# Patient Record
Sex: MALE | Race: WHITE
[De-identification: names, ages, dates, MRNs, and addresses within clinical notes are randomized per-mention and may not be internally consistent; named-entity substitution may affect disease eponyms.]

---

## 2017-01-28 ENCOUNTER — HOSPITAL ENCOUNTER (OUTPATIENT)
Dept: HOSPITAL 80 - FCPNEURO | Age: 62
End: 2017-01-28
Attending: PSYCHIATRY & NEUROLOGY
Payer: COMMERCIAL

## 2017-01-28 DIAGNOSIS — G47.33: Primary | ICD-10-CM

## 2017-12-20 ENCOUNTER — HOSPITAL ENCOUNTER (EMERGENCY)
Dept: HOSPITAL 80 - FED | Age: 62
Discharge: HOME | End: 2017-12-20
Payer: COMMERCIAL

## 2017-12-20 VITALS
RESPIRATION RATE: 18 BRPM | SYSTOLIC BLOOD PRESSURE: 148 MMHG | HEART RATE: 89 BPM | OXYGEN SATURATION: 92 % | DIASTOLIC BLOOD PRESSURE: 77 MMHG

## 2017-12-20 VITALS — TEMPERATURE: 97.7 F

## 2017-12-20 DIAGNOSIS — E11.9: ICD-10-CM

## 2017-12-20 DIAGNOSIS — R10.31: Primary | ICD-10-CM

## 2017-12-20 DIAGNOSIS — I10: ICD-10-CM

## 2017-12-20 DIAGNOSIS — Z79.82: ICD-10-CM

## 2017-12-20 DIAGNOSIS — E86.9: ICD-10-CM

## 2017-12-20 LAB
% IMMATURE GRANULYOCYTES: 0.3 % (ref 0–1.1)
ABSOLUTE IMMATURE GRANULOCYTES: 0.02 10^3/UL (ref 0–0.1)
ABSOLUTE NRBC COUNT: 0 10^3/UL (ref 0–0.01)
ADD DIFF?: NO
ADD MORPH?: NO
ADD SCAN?: NO
ALBUMIN SERPL-MCNC: 3.6 G/DL (ref 3.5–5)
ALP SERPL-CCNC: 46 IU/L (ref 38–126)
ALT SERPL-CCNC: 76 IU/L (ref 21–72)
ANION GAP SERPL CALC-SCNC: 12 MEQ/L (ref 8–16)
AST SERPL-CCNC: 41 IU/L (ref 17–59)
ATYPICAL LYMPHOCYTE FLAG: 50 (ref 0–99)
BILIRUB SERPL-MCNC: 0.6 MG/DL (ref 0.1–1.4)
BILIRUBIN-CONJUGATED: 0.2 MG/DL (ref 0–0.5)
BILIRUBIN-UNCONJUGATED: 0.4 MG/DL (ref 0–1.1)
CALCIUM SERPL-MCNC: 8.8 MG/DL (ref 8.5–10.4)
CHLORIDE SERPL-SCNC: 106 MEQ/L (ref 97–110)
CO2 SERPL-SCNC: 21 MEQ/L (ref 22–31)
COLOR UR: (no result)
CREAT SERPL-MCNC: 0.8 MG/DL (ref 0.7–1.3)
ERYTHROCYTE [DISTWIDTH] IN BLOOD BY AUTOMATED COUNT: 12.5 % (ref 11.5–15.2)
FRAGMENT RBC FLAG: 0 (ref 0–99)
GFR SERPL CREATININE-BSD FRML MDRD: > 60 ML/MIN/{1.73_M2}
GLUCOSE SERPL-MCNC: 104 MG/DL (ref 70–100)
HCT VFR BLD CALC: 43.1 % (ref 40–51)
HGB BLD-MCNC: 15.3 G/DL (ref 13.7–17.5)
LEFT SHIFT FLG: 0 (ref 0–99)
LIPEMIA HEMOLYSIS FLAG: 90 (ref 0–99)
MCH RBC BLDCO QN: 31 PG (ref 27.9–34.1)
MCHC RBC AUTO-ENTMCNC: 35.5 G/DL (ref 32.4–36.7)
MCV RBC AUTO: 87.4 FL (ref 81.5–99.8)
MUCOUS THREADS #/AREA URNS LPF: (no result) /LPF
NITRITE UR QL STRIP: NEGATIVE
NRBC-AUTO%: 0 % (ref 0–0.2)
PH UR STRIP: 5 [PH] (ref 5–7.5)
PLATELET # BLD: 232 10^3/UL (ref 150–400)
PLATELET CLUMPS FLAG: 0 (ref 0–99)
PMV BLD AUTO: 9.6 FL (ref 8.7–11.7)
POTASSIUM SERPL-SCNC: 4.5 MEQ/L (ref 3.5–5.2)
PROT SERPL-MCNC: 6.1 G/DL (ref 6.3–8.2)
RBC # BLD AUTO: 4.93 10^6/UL (ref 4.4–6.38)
RBC #/AREA URNS HPF: (no result) /HPF (ref 0–3)
SODIUM SERPL-SCNC: 139 MEQ/L (ref 134–144)
SP GR UR STRIP: 1.01 (ref 1–1.03)
WBC #/AREA URNS HPF: (no result) /HPF (ref 0–3)

## 2017-12-20 PROCEDURE — 74177 CT ABD & PELVIS W/CONTRAST: CPT

## 2017-12-20 PROCEDURE — 96360 HYDRATION IV INFUSION INIT: CPT

## 2017-12-20 PROCEDURE — 99285 EMERGENCY DEPT VISIT HI MDM: CPT

## 2017-12-20 NOTE — EDPHY
H & P


Time Seen by Provider: 12/20/17 10:34


HPI/ROS: 





CHIEF COMPLAINT:  Abdominal pain





HISTORY OF PRESENT ILLNESS:  62-year-old man started getting sick last Thursday 

with nausea and vomiting.  He was better on Saturday but then developed 

diarrhea on Sunday and had another episode of vomiting on Monday.  Presents 

with 24 hr of right-sided abdominal pain but he is able to take oral fluids 

now.  Not associated with fever or chills or urinary symptoms. Does not 

radiate.  Not better worse with eating.





REVIEW OF SYSTEMS:


Eye: no change in vision


ENT: no sore throat


Cardiac: no chest pain or syncope


Pulmonary: no cough or SOB


Abdomen:  HPI


Musculoskeletal: no back pain


Skin: no rash


Neuro: no headache


Constitutional: no fever


: no urinary symptoms





A comprehensive 10 point review of systems is otherwise negative aside from 

elements mentioned in the history of present illness.





PAST MEDICAL HISTORY:  Includes traumatic brain injury, diabetes, hypertension, 

glaucoma, reflux.





Social history:  The patient arrives with a caregiver from [facility] house.





General Appearance: Alert and conversant, cooperative.


Eyes: No scleral  icterus. 


ENT, Mouth: Normal mucous membranes.


Respiratory: Normal respiratory effort, breath sounds equal, lungs are clear to 

auscultation.


Cardiovascular:  Regular rate and rhythm.


Gastrointestinal:  Right lower quadrant and right lateral abdominal tenderness 

but no rebound or guarding, normal male .


Neurological: Alert and oriented x3.   Normally conversant. Face symmetric, 

normal movement and sensation in all extremities.


Skin: Warm and dry, no rashes.


Musculoskeletal: No peripheral edema and no joint swelling.


Psychiatric: Not agitated.





Emergency Department course/MDM:


IV normal saline hydration, i-STAT and CT abdomen and pelvis to evaluate for 

appendicitis or diverticulitis, urinalysis.





1206: CT shows normal appendix, no diverticulitis or perforation other reason 

for abdominal pain. Sarah.


1233:  Results discussed with the patient, probable abdominal pain from his 

gastrointestinal illness or from vomiting.  Doubt surgical abdominal condition, 

stable for discharge.


Smoking Status: Never smoked


Constitutional: 


 Initial Vital Signs











Temperature (C)  36.5 C   12/20/17 10:29


 


Heart Rate  77   12/20/17 10:29


 


Respiratory Rate  16   12/20/17 10:29


 


Blood Pressure  146/93 H  12/20/17 10:29


 


O2 Sat (%)  93   12/20/17 10:29








 











O2 Delivery Mode               Room Air














Allergies/Adverse Reactions: 


 





No Known Allergies Allergy (Unverified 12/20/17 10:27)


 








Home Medications: 














 Medication  Instructions  Recorded


 


Arimidex 1 mg (*)  08/15/16


 


Aspirin 81mg (*)  08/15/16


 


BETAGAN 0.5% (*)  08/15/16


 


Lisinopril  08/15/16


 


MAGNESIUM  08/15/16


 


Ranitidine HCl  08/15/16


 


Various Vitamins  08/15/16


 


Ferrous Sulfate  12/20/17


 


Glucosamine  12/20/17


 


Mirapex  12/20/17














Medical Decision Making





- Diagnostics


Imaging Results: 


 Imaging Impressions





Abdomen CT  12/20/17 10:54


Impression: 


 


1. Bibasilar pleural calcifications, likely reflecting prior asbestos-related 

pleural disease.


2. Hepatomegaly.


3. Subcentimeter right hepatic dome cyst, and a 6.3 cm right renal cortical 

simple cyst.


4. Constipation/obstipation.


5. Mild descending colon and sigmoid colon diverticulosis without active 

diverticulitis.


6. Normal appearance of the retrocecal appendix.


7. Prostatomegaly. 


8. Morbid obesity. There is also some nonspecific inflammation of the deep 

subcutaneous adipose peripheral lateral to the right external oblique muscle.


 


Findings were discussed with KIRILL KAISER MD at 12:03, on 12/20/2017.


 














- Data Points


Laboratory Results: 


 Laboratory Results





 12/20/17 10:45 





 12/20/17 10:45 





 











  12/20/17 12/20/17 12/20/17





  12:05 10:45 10:45


 


WBC      7.79 10^3/uL 10^3/uL





     (3.80-9.50) 


 


RBC      4.93 10^6/uL 10^6/uL





     (4.40-6.38) 


 


Hgb      15.3 g/dL g/dL





     (13.7-17.5) 


 


POC Hgb      





    


 


Hct      43.1 % %





     (40.0-51.0) 


 


POC Hct      





    


 


MCV      87.4 fL fL





     (81.5-99.8) 


 


MCH      31.0 pg pg





     (27.9-34.1) 


 


MCHC      35.5 g/dL g/dL





     (32.4-36.7) 


 


RDW      12.5 % %





     (11.5-15.2) 


 


Plt Count      232 10^3/uL 10^3/uL





     (150-400) 


 


MPV      9.6 fL fL





     (8.7-11.7) 


 


Neut % (Auto)      60.1 % %





     (39.3-74.2) 


 


Lymph % (Auto)      27.7 % %





     (15.0-45.0) 


 


Mono % (Auto)      8.3 % %





     (4.5-13.0) 


 


Eos % (Auto)      3.0 % %





     (0.6-7.6) 


 


Baso % (Auto)      0.6 % %





     (0.3-1.7) 


 


Nucleat RBC Rel Count      0.0 % %





     (0.0-0.2) 


 


Absolute Neuts (auto)      4.68 10^3/uL 10^3/uL





     (1.70-6.50) 


 


Absolute Lymphs (auto)      2.16 10^3/uL 10^3/uL





     (1.00-3.00) 


 


Absolute Monos (auto)      0.65 10^3/uL 10^3/uL





     (0.30-0.80) 


 


Absolute Eos (auto)      0.23 10^3/uL 10^3/uL





     (0.03-0.40) 


 


Absolute Basos (auto)      0.05 10^3/uL 10^3/uL





     (0.02-0.10) 


 


Absolute Nucleated RBC      0.00 10^3/uL 10^3/uL





     (0-0.01) 


 


Immature Gran %      0.3 % %





     (0.0-1.1) 


 


Immature Gran #      0.02 10^3/uL 10^3/uL





     (0.00-0.10) 


 


POC Sodium      





    


 


Sodium    139 mEq/L mEq/L  





    (134-144)  


 


POC Potassium      





    


 


Potassium    4.5 mEq/L mEq/L  





    (3.5-5.2)  


 


POC Chloride      





    


 


Chloride    106 mEq/L mEq/L  





    ()  


 


Carbon Dioxide    21 mEq/l L mEq/l  





    (22-31)  


 


Anion Gap    12 mEq/L mEq/L  





    (8-16)  


 


POC BUN      





    


 


BUN    13 mg/dL mg/dL  





    (7-23)  


 


Creatinine    0.8 mg/dL mg/dL  





    (0.7-1.3)  


 


POC Creatinine      





    


 


Estimated GFR    > 60   





    


 


Glucose    104 mg/dL H mg/dL  





    ()  


 


POC Glucose      





    


 


Calcium    8.8 mg/dL mg/dL  





    (8.5-10.4)  


 


Total Bilirubin    0.6 mg/dL mg/dL  





    (0.1-1.4)  


 


Conjugated Bilirubin    0.2 mg/dL mg/dL  





    (0.0-0.5)  


 


Unconjugated Bilirubin    0.4 mg/dL mg/dL  





    (0.0-1.1)  


 


AST    41 IU/L IU/L  





    (17-59)  


 


ALT    76 IU/L H IU/L  





    (21-72)  


 


Alkaline Phosphatase    46 IU/L IU/L  





    ()  


 


Total Protein    6.1 g/dL L g/dL  





    (6.3-8.2)  


 


Albumin    3.6 g/dL g/dL  





    (3.5-5.0)  


 


Lipase    76 IU/L IU/L  





    ()  


 


Urine Color  PALE YELLOW     





    


 


Urine Appearance  CLEAR     





    


 


Urine pH  5.0     





   (5.0-7.5)   


 


Ur Specific Gravity  1.013     





   (1.002-1.030)   


 


Urine Protein  NEGATIVE     





   (NEGATIVE)   


 


Urine Ketones  NEGATIVE     





   (NEGATIVE)   


 


Urine Blood  NEGATIVE     





   (NEGATIVE)   


 


Urine Nitrate  NEGATIVE     





   (NEGATIVE)   


 


Urine Bilirubin  NEGATIVE     





   (NEGATIVE)   


 


Urine Urobilinogen  NEGATIVE EU EU    





   (0.2-1.0)   


 


Ur Leukocyte Esterase  NEGATIVE     





   (NEGATIVE)   


 


Urine RBC  1-3 /hpf /hpf    





   (0-3)   


 


Urine WBC  NONE SEEN /hpf /hpf    





   (0-3)   


 


Ur Epithelial Cells  NONE SEEN /lpf /lpf    





   (NONE-1+)   


 


Urine Mucus  TRACE /lpf /lpf    





   (NONE-1+)   


 


Urine Glucose  1+  H     





   (NEGATIVE)   














  12/20/17





  10:40


 


WBC  





  


 


RBC  





  


 


Hgb  





  


 


POC Hgb  15.0 gm/dL gm/dL





   (13.7-17.5) 


 


Hct  





  


 


POC Hct  44 % %





   (40-51) 


 


MCV  





  


 


MCH  





  


 


MCHC  





  


 


RDW  





  


 


Plt Count  





  


 


MPV  





  


 


Neut % (Auto)  





  


 


Lymph % (Auto)  





  


 


Mono % (Auto)  





  


 


Eos % (Auto)  





  


 


Baso % (Auto)  





  


 


Nucleat RBC Rel Count  





  


 


Absolute Neuts (auto)  





  


 


Absolute Lymphs (auto)  





  


 


Absolute Monos (auto)  





  


 


Absolute Eos (auto)  





  


 


Absolute Basos (auto)  





  


 


Absolute Nucleated RBC  





  


 


Immature Gran %  





  


 


Immature Gran #  





  


 


POC Sodium  140 mEq/L mEq/L





   (134-144) 


 


Sodium  





  


 


POC Potassium  4.3 mEq/L mEq/L





   (3.3-5.0) 


 


Potassium  





  


 


POC Chloride  103 mEq/L mEq/L





   () 


 


Chloride  





  


 


Carbon Dioxide  





  


 


Anion Gap  





  


 


POC BUN  14 mg/dL mg/dL





   (7-23) 


 


BUN  





  


 


Creatinine  





  


 


POC Creatinine  0.8 mg/dL mg/dL





   (0.7-1.3) 


 


Estimated GFR  





  


 


Glucose  





  


 


POC Glucose  110 mg/dL H mg/dL





   () 


 


Calcium  





  


 


Total Bilirubin  





  


 


Conjugated Bilirubin  





  


 


Unconjugated Bilirubin  





  


 


AST  





  


 


ALT  





  


 


Alkaline Phosphatase  





  


 


Total Protein  





  


 


Albumin  





  


 


Lipase  





  


 


Urine Color  





  


 


Urine Appearance  





  


 


Urine pH  





  


 


Ur Specific Gravity  





  


 


Urine Protein  





  


 


Urine Ketones  





  


 


Urine Blood  





  


 


Urine Nitrate  





  


 


Urine Bilirubin  





  


 


Urine Urobilinogen  





  


 


Ur Leukocyte Esterase  





  


 


Urine RBC  





  


 


Urine WBC  





  


 


Ur Epithelial Cells  





  


 


Urine Mucus  





  


 


Urine Glucose  





  











Medications Given: 


 








Discontinued Medications





Sodium Chloride (Ns)  1,000 mls @ 0 mls/hr IV EDNOW ONE; Wide Open


   PRN Reason: Protocol


   Stop: 12/20/17 10:49


   Last Admin: 12/20/17 10:49 Dose:  1,000 mls





Point of Care Test Results: 


 











  12/20/17





  10:40


 


POC Sodium  140


 


POC Potassium  4.3


 


POC Chloride  103


 


POC BUN  14


 


POC Creatinine  0.8


 


POC Glucose  110 H














Departure





- Departure


Disposition: Home, Routine, Self-Care


Clinical Impression: 


 Abdominal pain





Condition: Good


Instructions:  Acute Abdominal Pain (ED)


Referrals: 


Mariola Choi MD [Primary Care Provider] - As per Instructions

## 2018-01-05 ENCOUNTER — HOSPITAL ENCOUNTER (OUTPATIENT)
Dept: HOSPITAL 80 - BMCIMAGING | Age: 63
End: 2018-01-05
Attending: PHYSICIAN ASSISTANT
Payer: COMMERCIAL

## 2018-01-05 DIAGNOSIS — M25.561: Primary | ICD-10-CM

## 2018-01-10 ENCOUNTER — HOSPITAL ENCOUNTER (EMERGENCY)
Dept: HOSPITAL 80 - CED | Age: 63
Discharge: HOME | End: 2018-01-10
Payer: COMMERCIAL

## 2018-01-10 VITALS
HEART RATE: 77 BPM | TEMPERATURE: 98.4 F | DIASTOLIC BLOOD PRESSURE: 82 MMHG | SYSTOLIC BLOOD PRESSURE: 156 MMHG | OXYGEN SATURATION: 92 %

## 2018-01-10 VITALS — RESPIRATION RATE: 20 BRPM

## 2018-01-10 DIAGNOSIS — R10.11: ICD-10-CM

## 2018-01-10 DIAGNOSIS — E11.9: ICD-10-CM

## 2018-01-10 DIAGNOSIS — I10: ICD-10-CM

## 2018-01-10 DIAGNOSIS — Z79.82: ICD-10-CM

## 2018-01-10 DIAGNOSIS — R10.13: Primary | ICD-10-CM

## 2018-01-10 DIAGNOSIS — R10.12: ICD-10-CM

## 2018-01-10 LAB — PLATELET # BLD: 258 10^3/UL (ref 150–400)

## 2018-01-10 NOTE — EDPHY
H & P


Time Seen by Provider: 01/10/18 12:39


HPI/ROS: 





Chief complaint.  Epigastric pain





HPI.  Patient is 62-year-old male presents emergency department with epigastric 

pain that was present on awakening this morning.  It is worse after eating.  He 

has had similar symptoms previously.  He tells me frequent nausea but no 

vomiting today.  Slight shortness of breath that is worse with deep breathing.  

His upper abdominal pain goes through to his back.  He describes as sharp and 

stabbing.  He also complains of chest discomfort.  The patient has mental 

retardation and traumatic brain injury and seems to answer yes to all my 

questions.  Some diarrhea yesterday.  No fever.





ROS


Constitutional.  no fever/chills, no weakness


Eyes.  no problems with vision


ENT.  no sore throat, no nasal drainage


Cardiovascular.  Chest pain


Respiratory.  Shortness of breath and cough


Abdominal.  Upper abdominal pain with nausea


.  no problems urinating


MS.  no calf pain/swelling, no neck/back pain, no joint pain


Skin.  no rash


Lymph.  no swollen glands


Neuro.  no headache, no dizziness, no difficulty walking or with speech


Past Medical/Surgical History: 





Medical retardation, traumatic brain injury, hypertension, diabetes, GERD, 

glaucoma


Social History: 





Single, nonsmoker, no alcohol


Smoking Status: Never smoked


Physical Exam: 





General Appearance:  Alert well-developed male no distress vital signs are 

stable


Eyes: Pupils equal and round no pallor or injection.


ENT, Mouth:  Mucous membranes are moist.


Respiratory:  There are no retractions, lungs are clear to auscultation.


Cardiovascular: Regular rate and rhythm.


Gastrointestinal:  Abdomen is soft with mild tenderness to palpation in the 

right and left upper quadrants as well as epigastrium.  No masses. Normal bowel 

sounds


Neurological:  Awake and alert, sensory and motor exams grossly normal.


Skin: Warm and dry, no rashes.


Musculoskeletal:  Neck is supple nontender.


Extremities  symmetrical, full range of motion.


Psychiatric: Patient is oriented X 3, there is no agitation.


Constitutional: 


 Initial Vital Signs











Temperature (C)  36.8 C   01/10/18 12:31


 


Heart Rate  88   01/10/18 12:31


 


Respiratory Rate  18   01/10/18 12:31


 


Blood Pressure  137/79 H  01/10/18 12:31


 


O2 Sat (%)  93   01/10/18 12:31








 











O2 Delivery Mode               Room Air














Allergies/Adverse Reactions: 


 





No Known Allergies Allergy (Unverified 01/10/18 12:34)


 








Home Medications: 














 Medication  Instructions  Recorded


 


Arimidex 1 mg (*)  08/15/16


 


Aspirin 81mg (*)  08/15/16


 


BETAGAN 0.5% (*)  08/15/16


 


Lisinopril  08/15/16


 


MAGNESIUM  08/15/16


 


Ranitidine HCl  08/15/16


 


Various Vitamins  08/15/16


 


Ferrous Sulfate  12/20/17


 


Glucosamine  12/20/17


 


Mirapex  12/20/17


 


Ranitidine HCl 150 mg PO BID #14 tablet 01/10/18














Medical Decision Making





- Diagnostics


EKG Interpretation: 





EKG interpreted by me shows normal sinus rhythm with normal interval and axis.  

QRS shows borderline Q-waves in lead 3 but not other inferior leads.  There is 

no significant ST elevation or depression.  No arrhythmia.  The rate is 89.





No previous EKGs for comparison


Imaging Results: 


 Imaging Impressions





Chest X-Ray  01/10/18 12:58


Impression:


1. No active cardiopulmonary disease seen.


2. Elevation of the right hemidiaphragm with hepatic flexure transverse colon 

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 

syndrome)








Abdomen Ultrasound  01/10/18 12:59


Impression:


 


1. Technically limited study because of the presence of bowel gas and secondary 

to the patient's body habitus.


2. Inadequate assessment of the pancreas, proximal abdominal aorta, and the 

left hepatic lobe, with limited assessment of the gallbladder. There is no 

obvious cholelithiasis, or secondary evidence of cholecystitis.


3. There is a 6.2 cm anterior midpole right renal cortical cyst.


 


Findings were discussed with YAKELIN NEELY MD at 14:32, on 1/10/2018.


 


 








Two view chest x-ray interpreted by me is negative for pneumonia or acute 

disease





Ultrasound is a limited study however there is no obvious evidence for 

gallbladder disease


Procedures: 





IV normal saline, monitor


ED Course/Re-evaluation: 





Re-evaluation 2:40 p.m.--the patient and family and I discussed imaging and lab 

results.  The patient is stable has no symptoms now.  We discussed treatment 

plan including criteria for return importance of follow-up and further 

evaluation





The family tells me that over the Christmas holiday the patient had some type 

of stomach bug and had increase of his ranitidine from once to twice daily for 

similar symptoms but now has gone back to once daily.


Differential Diagnosis: 





I considered acute coronary syndrome, pulmonary embolus, pancreatitis, 

cholecystitis.  I suspect that this is gastritis and stomach irritation.





- Data Points


Laboratory Results: 


 Laboratory Results





 01/10/18 12:40 





 01/10/18 12:40 





 











  01/10/18 01/10/18 01/10/18





  12:40 12:40 12:40


 


WBC      





    


 


RBC      





    


 


Hgb      





    


 


Hct      





    


 


MCV      





    


 


MCH      





    


 


MCHC      





    


 


RDW      





    


 


Plt Count      





    


 


MPV      





    


 


Neut % (Auto)      





    


 


Lymph % (Auto)      





    


 


Mono % (Auto)      





    


 


Eos % (Auto)      





    


 


Baso % (Auto)      





    


 


Nucleat RBC Rel Count      





    


 


Absolute Neuts (auto)      





    


 


Absolute Lymphs (auto)      





    


 


Absolute Monos (auto)      





    


 


Absolute Eos (auto)      





    


 


Absolute Basos (auto)      





    


 


Absolute Nucleated RBC      





    


 


Immature Gran %      





    


 


Immature Gran #      





    


 


D-Dimer  0.31 ug/mLFEU ug/mLFEU    





   (0.00-0.50)   


 


Sodium      139 mEq/L mEq/L





     (135-145) 


 


Potassium      4.3 mEq/L mEq/L





     (3.5-5.2) 


 


Chloride      99 mEq/L mEq/L





     () 


 


Carbon Dioxide      26 mEq/l mEq/l





     (22-31) 


 


Anion Gap      14 mEq/L mEq/L





     (8-16) 


 


BUN      13 mg/dL mg/dL





     (7-23) 


 


Creatinine      0.9 mg/dL mg/dL





     (0.7-1.3) 


 


Estimated GFR      > 60 





    


 


Glucose      158 mg/dL H mg/dL





     () 


 


Calcium      8.7 mg/dL mg/dL





     (8.5-10.4) 


 


Total Bilirubin    0.5 mg/dL mg/dL  





    (0.1-1.4)  


 


Conjugated Bilirubin    0.1 mg/dL mg/dL  





    (0.0-0.5)  


 


Unconjugated Bilirubin    0.4 mg/dL mg/dL  





    (0.0-1.1)  


 


AST    21 IU/L IU/L  





    (17-59)  


 


ALT    47 IU/L IU/L  





    (21-72)  


 


Alkaline Phosphatase    57 IU/L IU/L  





    ()  


 


Troponin I    < 0.012 ng/mL ng/mL  





    (0.000-0.034)  


 


Total Protein    6.6 g/dL g/dL  





    (6.3-8.2)  


 


Albumin    3.8 g/dL g/dL  





    (3.5-5.0)  


 


Lipase    153 IU/L IU/L  





    ()  














  01/10/18





  12:40


 


WBC  6.24 10^3/uL 10^3/uL





   (3.80-9.50) 


 


RBC  5.14 10^6/uL 10^6/uL





   (4.40-6.38) 


 


Hgb  15.4 g/dL g/dL





   (13.7-17.5) 


 


Hct  45.4 % %





   (40.0-51.0) 


 


MCV  88.3 fL fL





   (81.5-99.8) 


 


MCH  30.0 pg pg





   (27.9-34.1) 


 


MCHC  33.9 g/dL g/dL





   (32.4-36.7) 


 


RDW  12.3 % %





   (11.5-15.2) 


 


Plt Count  258 10^3/uL 10^3/uL





   (150-400) 


 


MPV  9.5 fL fL





   (8.7-11.7) 


 


Neut % (Auto)  55.9 % %





   (39.3-74.2) 


 


Lymph % (Auto)  31.7 % %





   (15.0-45.0) 


 


Mono % (Auto)  8.3 % %





   (4.5-13.0) 


 


Eos % (Auto)  3.0 % %





   (0.6-7.6) 


 


Baso % (Auto)  0.8 % %





   (0.3-1.7) 


 


Nucleat RBC Rel Count  0.0 % %





   (0.0-0.2) 


 


Absolute Neuts (auto)  3.48 10^3/uL 10^3/uL





   (1.70-6.50) 


 


Absolute Lymphs (auto)  1.98 10^3/uL 10^3/uL





   (1.00-3.00) 


 


Absolute Monos (auto)  0.52 10^3/uL 10^3/uL





   (0.30-0.80) 


 


Absolute Eos (auto)  0.19 10^3/uL 10^3/uL





   (0.03-0.40) 


 


Absolute Basos (auto)  0.05 10^3/uL 10^3/uL





   (0.02-0.10) 


 


Absolute Nucleated RBC  0.00 10^3/uL 10^3/uL





   (0-0.01) 


 


Immature Gran %  0.3 % %





   (0.0-1.1) 


 


Immature Gran #  0.02 10^3/uL 10^3/uL





   (0.00-0.10) 


 


D-Dimer  





  


 


Sodium  





  


 


Potassium  





  


 


Chloride  





  


 


Carbon Dioxide  





  


 


Anion Gap  





  


 


BUN  





  


 


Creatinine  





  


 


Estimated GFR  





  


 


Glucose  





  


 


Calcium  





  


 


Total Bilirubin  





  


 


Conjugated Bilirubin  





  


 


Unconjugated Bilirubin  





  


 


AST  





  


 


ALT  





  


 


Alkaline Phosphatase  





  


 


Troponin I  





  


 


Total Protein  





  


 


Albumin  





  


 


Lipase  





  














Departure





- Departure


Disposition: Home, Routine, Self-Care


Clinical Impression: 


 Abdominal pain





Condition: Good


Instructions:  Abdominal Pain (ED)


Additional Instructions: 


Increased ranitidine to twice daily.  May supplement this with Maalox or 

Mylanta twice daily.  Return for worsening symptoms including worsening chest 

discomfort or trouble breathing or abdominal pain.  Re-evaluation by Dr. Choi 

in 3-4 days


Referrals: 


Mariola Choi MD [Primary Care Provider] - As per Instructions


Prescriptions: 


Ranitidine HCl 150 mg PO BID #14 tablet

## 2018-01-10 NOTE — CPEKG
Heart Rate: 89

RR Interval: 674

P-R Interval: 208

QRSD Interval: 102

QT Interval: 376

QTC Interval: 458

P Axis: 48

QRS Axis: 45

T Wave Axis: 8

EKG Severity - BORDERLINE ECG -

EKG Impression: SINUS RHYTHM

EKG Impression: BORDERLINE INFERIOR Q WAVES

Electronically Signed By: Oskar Torres 10-Iain-2018 13:00:21

## 2018-02-08 ENCOUNTER — HOSPITAL ENCOUNTER (OUTPATIENT)
Dept: HOSPITAL 80 - CIMAGING | Age: 63
End: 2018-02-08
Attending: UROLOGY
Payer: COMMERCIAL

## 2018-02-08 DIAGNOSIS — J94.8: ICD-10-CM

## 2018-02-08 DIAGNOSIS — K57.30: ICD-10-CM

## 2018-02-08 DIAGNOSIS — N28.1: Primary | ICD-10-CM

## 2018-02-08 PROCEDURE — 74178 CT ABD&PLV WO CNTR FLWD CNTR: CPT

## 2018-04-25 ENCOUNTER — HOSPITAL ENCOUNTER (OUTPATIENT)
Dept: HOSPITAL 80 - BMCIMAGING | Age: 63
End: 2018-04-25
Attending: INTERNAL MEDICINE
Payer: COMMERCIAL

## 2018-04-25 DIAGNOSIS — R91.8: Primary | ICD-10-CM

## 2018-10-25 ENCOUNTER — HOSPITAL ENCOUNTER (EMERGENCY)
Dept: HOSPITAL 80 - CED | Age: 63
Discharge: HOME | End: 2018-10-25
Payer: COMMERCIAL

## 2018-10-25 VITALS — SYSTOLIC BLOOD PRESSURE: 145 MMHG | DIASTOLIC BLOOD PRESSURE: 79 MMHG
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
## 2018-10-25 DIAGNOSIS — R40.2412: ICD-10-CM
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
## 2018-10-25 DIAGNOSIS — S01.81XA: Primary | ICD-10-CM

## 2018-10-25 PROCEDURE — 0HQ1XZZ REPAIR FACE SKIN, EXTERNAL APPROACH: ICD-10-PCS | Performed by: EMERGENCY MEDICINE

## 2018-10-25 NOTE — EDPHY
H & P


Time Seen by Provider: 10/25/18 02:30


HPI/ROS: 





This patient is accompanied by his caretaker with him he lives in presents with 

a laceration to his forehead sustained when he rolled out of bed while sleeping 

last night shortly prior to arrival.  He believes he struck his head against a 

desk chair that was adjacent to his bedtime.  He reports pain at the forehead 

and pain in the neck since the fall.  He states the pain is moderate in both of 

those spots.  He reports that the pain in the forehead is only at the skin and 

denies any generalized headache.  He denies any other associated injuries 

besides the neck pain.  His caretaker drove him in by private vehicle for 

evaluation.





ROS:  Constitutional:  He felt well prior to the fall


HEENT:  No nasal injury or other facial complaints


Musculoskeletal:  No extremity injuries.


Pulmonary:  No complaints 


cardiovascular:  No complaints 


GI:  No abdominal pain


Neuro:  No generalized headache, numbness in his extremities except for 

longstanding bilateral feet paresthesias that his caretaker thinks is 

attributable to sciatica with no change since the fall.


10 point review of symptoms is performed and otherwise negative with exception 

of pertinent positives and negatives listed in HPI and ROS








Past Medical/Surgical History: 





Developmental delay


Smoking Status: Never smoked


Physical Exam: 





Physical exam:


Vital signs are normal


General:  Patient is in no acute distress.


HEENT:  Is no external evidence of trauma on exam except for a 2 cm full-

thickness laceration.  Frontalis muscle is visualized but there is no injury to 

the front talus.  There are no foreign bodies and direct examination.  There is 

no underlying bony tenderness, step-off or hematoma.    Nose atraumatic.  Ears:

  Clear bilaterally with no hemotympanum.  Oropharynx:  No dental trauma or 

malocclusion.  No intraoral lacerations.


Eyes:  Pupils are equal and reactive to light.  Extraocular motions are intact.

  Optic fundi:  Clear with no papilledema or hemorrhage.


Neck:  Trachea is midline with no stridor.  The patient has no midline neck 

tenderness and retains a full range of motion without increase in pain.


Lungs:  Clear to auscultation bilaterally


Cardiac:  Regular rate and rhythm no murmur gallop or rub.


Chest:  Nontender.


Abdomen:  Soft nontender no organomegaly


Back:  Nontender


Extremities:  Atraumatic


Neuro:  GCS of 15.  Cranial nerves II through XII intact.  Maintains normal 

sensory exam bilateral upper and lower extremities in terms of light touch 

sensory exam and 5/5 strength throughout.  No sensory or motor deficits are 

appreciated.





Initial differential diagnosis includes forehead laceration, minor head injury, 

neck strain, cervical fracture


Constitutional: 


 Initial Vital Signs











Temperature (C)  36.6 C   10/25/18 02:05


 


Heart Rate  85   10/25/18 02:05


 


Respiratory Rate  18   10/25/18 02:05


 


Blood Pressure  157/88 H  10/25/18 02:05


 


O2 Sat (%)  99   10/25/18 02:05








 











O2 Delivery Mode               Room Air














Allergies/Adverse Reactions: 


 





No Known Allergies Allergy (Unverified 01/10/18 12:34)


 








Home Medications: 














 Medication  Instructions  Recorded


 


Arimidex 1 mg (*)  08/15/16


 


Aspirin 81mg (*)  08/15/16


 


BETAGAN 0.5% (*)  08/15/16


 


Lisinopril  08/15/16


 


MAGNESIUM  08/15/16


 


Ranitidine HCl  08/15/16


 


Various Vitamins  08/15/16


 


Ferrous Sulfate  12/20/17


 


Glucosamine  12/20/17


 


Mirapex  12/20/17


 


Ranitidine HCl 150 mg PO BID #14 tablet 01/10/18














MDM/Departure





- MDM


Diagnostics: 





Cervical spine x-rays:  Negative for fracture by my interpretation


Imaging Results: 


Cervical spine AP lateral, swimmer's and odontoid:  Negative for fracture by my 

interpretation


Imaging: I viewed and interpreted images myself


Procedures: 





The wound is 2 cm full-thickness, described physical exam.  The wound was 

copiously irrigated with saline.  The wound was explored for foreign bodies and 

none were found.  The wound was prepped and draped in the normal sterile 

fashion.  The wound was anesthetized using let solution followed by 1% plain 

lidocaine, 27 gauge needle -3 mL with good effect  The edges were 

reapproximated using 5 0 Prolene -8 running sutures with good hemostasis and 

cosmesis.  The patient tolerated the procedure well.  There were no 

complications.


ED Course/Re-evaluation: 





Ibuprofen 600 mg p.o.





After reviewing the cervical spine x-rays a cleared from the C-collar.





Discussion:  Patient with forehead laceration and neck strain from fall.  No 

clinical evidence or radiographic evidence that would suggest more significant 

intracranial injury, or significant cervical injury.  However, caretaker and 

patient relies the need to return emergency department should the patient 

develop significant worsening of existing symptoms or onset of additional 

symptoms despite ibuprofen Tylenol for discomfort





- Depart


Disposition: Home, Routine, Self-Care


Clinical Impression: 


Forehead laceration


Qualifiers:


 Encounter type: initial encounter Qualified Code(s): S01.81XA - Laceration 

without foreign body of other part of head, initial encounter





Condition: Good


Instructions:  Facial Laceration (ED), Cervical Strain (ED)


Additional Instructions: 


Diagnosis:  Facial laceration 2. Cervical strain





Plan:  Keep the wound clean and dry for the next 2 days.  Then clean it daily 

with warm soapy water





Return for suture removal in 5-7 days





Ibuprofen Tylenol for discomfort as needed.





Return sooner for redness, discharge or other concerns for infection


Referrals: 


Patient,NotPresent [Primary Care Provider] - As per Instructions

## 2018-11-05 ENCOUNTER — HOSPITAL ENCOUNTER (EMERGENCY)
Dept: HOSPITAL 80 - FED | Age: 63
Discharge: HOME | End: 2018-11-05
Payer: COMMERCIAL

## 2018-11-05 VITALS — DIASTOLIC BLOOD PRESSURE: 70 MMHG | SYSTOLIC BLOOD PRESSURE: 138 MMHG
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
## 2018-11-05 DIAGNOSIS — Z79.82: ICD-10-CM

## 2018-11-05 DIAGNOSIS — E11.9: ICD-10-CM

## 2018-11-05 DIAGNOSIS — S81.812A: Primary | ICD-10-CM

## 2018-11-05 DIAGNOSIS — I10: ICD-10-CM
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
## 2018-11-05 PROCEDURE — 0HQLXZZ REPAIR LEFT LOWER LEG SKIN, EXTERNAL APPROACH: ICD-10-PCS | Performed by: EMERGENCY MEDICINE

## 2018-11-05 NOTE — EDPHY
H & P


Stated Complaint: lac to l shin area getting onto bus


Time Seen by Provider: 11/05/18 13:09


HPI/ROS: 


HPI:  This is a 63-year-old male who presents with





Chief Complaint:  Laceration to left shin getting onto a bus





Location:  Left shin


Quality:  Injury


Duration:  30 min prior to arrival


Signs and Symptoms:  + bleeding, no radiation, no numbness, no weakness, no 

tingling, no incontinence, no decreased range of motion, no swelling, no pain, 

no fever


Timing:  Acute


Severity:  Mild


Context:  Patient has a history of mental retardation, presents with complaints 

of sustaining a cut to his left shin.  He reports that he was stepping onto the 

bus when he hit the step.  Reports that it started to bleed.  His pain level 

currently is 4/10. Denies LOC/head injury/neck pain/dizziness/nausea/vomiting/

amnesia/radiation/weakness.  Takes baby aspirin.  Caretakers report that 

tetanus booster given in 2012


Modifying Factors:  Direct pressure





Comment: 








ROS:  A comprehensive 10 system review of systems is otherwise negative aside 

from elements mentioned in the history of present illness. 








MEDICAL/SURGICAL/SOCIAL HISTORY: 


Medical history:  htn/tbi/diabetes 2, GERD, BELLA, glaucoma, restless leg, mental 

retardation


Surgical history:  Denies


Social history:  Never smoked.





CONSTITUTIONAL:  Polite and cooperative, elderly white male, awake and alert, 

no obvious distress


HEENT: Atraumatic and normocephalic.


NECK: supple


EXTREMITIES:  2/2 pulses, strength 5/5, left shin shows 3 cm, superficial, 

vertical laceration with no active bleeding. DIP/PIP/MCP flexion/extension 

intact with good light touch sensation. no deformities, no clubbing, no 

cyanosis or edema.


NEUROLOGICAL: no focal neuro deficits.  GCS 15.  Light touch sensation intact.


SKIN: Warm and dry, no erythema. no rash.  Good capillary refill. 





Source: Patient


Exam Limitations: No limitations





- Personal History


Current Tetanus Diphtheria and Acellular Pertussis (TDAP): Unsure





- Medical/Surgical History


Hx Asthma: No


Hx Chronic Respiratory Disease: No


Hx Diabetes: Yes


Hx Cardiac Disease: No


Hx Renal Disease: No


Hx Cirrhosis: No


Hx Alcoholism: No


Hx HIV/AIDS: No


Hx Splenectomy or Spleen Trauma: No


Other PMH: htn/tbi/diabetes 2, GERD, BELLA, glaucoma, restless leg, mental 

retardation





- Social History


Smoking Status: Never smoked


Constitutional: 


 Initial Vital Signs











Temperature (C)  36.9 C   11/05/18 12:45


 


Heart Rate  98   11/05/18 12:45


 


Respiratory Rate  17   11/05/18 12:45


 


Blood Pressure  146/68 H  11/05/18 12:45


 


O2 Sat (%)  92   11/05/18 12:45








 











O2 Delivery Mode               Room Air














Allergies/Adverse Reactions: 


 





No Known Allergies Allergy (Verified 11/05/18 12:44)


 








Home Medications: 














 Medication  Instructions  Recorded


 


Arimidex 1 mg (*)  08/15/16


 


Aspirin 81mg (*)  08/15/16


 


BETAGAN 0.5% (*)  08/15/16


 


Lisinopril  08/15/16


 


MAGNESIUM  08/15/16


 


Ranitidine HCl  08/15/16


 


Various Vitamins  08/15/16


 


Ferrous Sulfate  12/20/17


 


Glucosamine  12/20/17


 


Mirapex  12/20/17


 


Ranitidine HCl 150 mg PO BID #14 tablet 01/10/18














Medical Decision Making


Procedures: 


Procedure:  Laceration repair.





Verbal consent was obtained from the patient.  The 3 cm, vertical, superficial 

laceration on the left anterior lower leg was anesthetized in the usual fashion 

using 3 mL of 1% lidocaine with epinephrine.  The wound was irrigated, draped 

and explored to its base with a gloved finger.  There were no deep structures 

involved.  No tendon injury was identified.  The wound was repaired with Steri-

Strips and Mastisol.  Clean sterile dressing applied.  The procedure was 

performed by myself.





ED Course/Re-evaluation: 


Tetanus up-to-date


Local anesthesia provided; irrigated copiously


Patient caretakers politely declined x-ray of tib-fib which I feels appropriate 

as it appears to only be a contusion at this time and low mechanism of injury


Laceration/abrasion is superficial nature; closed with Steri-Strips and clean 

sterile dressing applied including nonocclusive dressing 4 x 4 and Coban.


Verbal and written wound care instructions provided.








No signs of neurovascular compromise/tenting of skin/compartment syndrome/

extremities and joints examined above and below area of concern and are 

neurovascularly intact. 








This patient was seen under the supervision of my secondary supervising 

physician.  I evaluated care for this patient independently.  Discussed this 

patient with Dr. Larios.  





Differential Diagnosis: 


Differential diagnosis includes but is not limited to contusion, laceration, 

nerve injury, foreign body ache, fracture.








- Data Points


Medications Given: 


 








Discontinued Medications





Diphtheria/Tetanus/Acell Pertussis (Boostrix)  0.5 ml IM .ONCE ONE


   Stop: 11/05/18 13:15


   Last Admin: 11/05/18 13:33 Dose:  Not Given








Departure





- Departure


Disposition: Home, Routine, Self-Care


Clinical Impression: 


 Abrasion, left lower leg, initial encounter





Condition: Good


Instructions:  Skin Tear (ED), Abrasion (ED), Steristrips (ED)


Additional Instructions: 


Keep the dressing dry and in place for 2-3 days.


After 2-3 days, you may remove the dressing; wash the site daily with mild soap 

and water; then pat dry. 


Apply topical antibiotic ointment and clean sterile dressing daily until fully 

healed.


Take Tylenol 650 mg every 4 hours and/or Ibuprofen 600 mg every 8 hours with 

food as needed for pain. 








Return to the ER immediately if you experience redness, red streaks, have fevers

/chills, flu like symptoms, limited range of motion, or any other symptoms that 

concern you.





Referrals: 


Mariola Choi MD [Primary Care Provider] - 5-7 days, if not improved

## 2018-11-16 ENCOUNTER — HOSPITAL ENCOUNTER (EMERGENCY)
Dept: HOSPITAL 80 - CED | Age: 63
Discharge: HOME | End: 2018-11-16
Payer: COMMERCIAL

## 2018-11-16 VITALS — DIASTOLIC BLOOD PRESSURE: 66 MMHG | SYSTOLIC BLOOD PRESSURE: 136 MMHG
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
## 2018-11-16 DIAGNOSIS — S80.11XA: Primary | ICD-10-CM

## 2018-11-16 DIAGNOSIS — E11.9: ICD-10-CM
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
## 2018-11-16 DIAGNOSIS — F89: ICD-10-CM

## 2018-11-16 NOTE — EDPHY
H & P


Time Seen by Provider: 11/16/18 10:31


HPI/ROS: 





CHIEF COMPLAINT:  Right shin pain





History by patient





HISTORY OF PRESENT ILLNESS:  63-year-old male with developmental disability, 

diabetes and other medical problems presents with right shin pain and swelling 

after he banged his leg on a exercise machine while working with his personal 

.  His shin swelled up and it is painful when he walks.  He is 

ambulatory and his sister says he is walking slowly but not truly limping.  

This happened about 3 hr prior to admission.  He has not taken anything for 

pain.  He describes the pain as severe.  He has some tingling in his toes but 

this is bilateral and his sister says this is chronic and due to his diabetes.  

He is here with a sisters.  He denies any other pain or injury.  His tetanus is 

up-to-date.





REVIEW OF SYSTEMS:


As in HPI, and all other systems reviewed and are negative


Smoking Status: Never smoked


Physical Exam: 





General Appearance:  Alert and no distress, appears comfortable.


Head:  Normocephalic, atraumatic


Eyes:  Pupils equal and round no injection.  Extraocular movements are intact.


Musculoskeletal:  Neck is supple and nontender.


Extremities:  Right anterior lower leg with abrasions, ecchymoses swelling and 

tenderness, DP pulse 2 +, PT pulse 2 +, distal sensation intact, flexion and 

extension of ankle with some pain actively and minimal pain passively, no 

proximal fibular tenderness, full range of motion of knee, full range of motion 

of toes, no malleolar tenderness.


Skin:  No rashes or lesions except as described above.








Constitutional: 


 Initial Vital Signs











Temperature (C)  36.6 C   11/16/18 10:36


 


Heart Rate  78   11/16/18 10:36


 


Respiratory Rate  20   11/16/18 10:36


 


Blood Pressure  156/88 H  11/16/18 10:36


 


O2 Sat (%)  97   11/16/18 10:36








 











O2 Delivery Mode               Room Air














Allergies/Adverse Reactions: 


 





No Known Allergies Allergy (Verified 11/05/18 12:44)


 








Home Medications: 














 Medication  Instructions  Recorded


 


Arimidex 1 mg (*)  08/15/16


 


Aspirin 81mg (*)  08/15/16


 


BETAGAN 0.5% (*)  08/15/16


 


Lisinopril  08/15/16


 


MAGNESIUM  08/15/16


 


Ranitidine HCl  08/15/16


 


Various Vitamins  08/15/16


 


Ferrous Sulfate  12/20/17


 


Glucosamine  12/20/17


 


Mirapex  12/20/17


 


Ranitidine HCl 150 mg PO BID #14 tablet 01/10/18














MDM/Departure





- MDM


Medications Given: 


 








Discontinued Medications





Acetaminophen (Tylenol)  1,000 mg PO EDNOW ONE


   Stop: 11/16/18 10:42


   Last Admin: 11/16/18 10:54 Dose:  1,000 mg








- Depart


Disposition: Home, Routine, Self-Care


Clinical Impression: 


Contusion


Qualifiers:


 Encounter type: initial encounter Contusion area: lower leg Laterality: right 

Qualified Code(s): S80.11XA - Contusion of right lower leg, initial encounter





Condition: Good


Instructions:  Contusion in Adults (ED)


Additional Instructions: 


You were seen by Dr. Violeta Malone today.





There was no evidence of fracture.  I recommend Tylenol 1000 mg every 6 hr as 

needed for pain and icing the leg.  To reduce swelling and pain keep the leg 

elevated when you are not up and around.  You may put as much weight on the leg 

as you can tolerate.  You will not make it worse by walking on it.





 Return for any worsening or new concerns.


Referrals: 


Mariola Choi MD [Primary Care Provider] - As per Instructions

## 2018-11-18 ENCOUNTER — HOSPITAL ENCOUNTER (EMERGENCY)
Dept: HOSPITAL 80 - CED | Age: 63
Discharge: HOME | End: 2018-11-18
Payer: COMMERCIAL

## 2018-11-18 VITALS — DIASTOLIC BLOOD PRESSURE: 62 MMHG | SYSTOLIC BLOOD PRESSURE: 134 MMHG

## 2018-11-18 DIAGNOSIS — E11.9: ICD-10-CM

## 2018-11-18 DIAGNOSIS — L03.115: ICD-10-CM

## 2018-11-18 DIAGNOSIS — R23.3: Primary | ICD-10-CM

## 2018-11-18 PROCEDURE — 96361 HYDRATE IV INFUSION ADD-ON: CPT

## 2018-11-18 PROCEDURE — 96374 THER/PROPH/DIAG INJ IV PUSH: CPT

## 2018-11-18 PROCEDURE — 93971 EXTREMITY STUDY: CPT

## 2018-11-18 PROCEDURE — 99285 EMERGENCY DEPT VISIT HI MDM: CPT

## 2018-11-18 NOTE — EDPHY
H & P


Time Seen by Provider: 11/18/18 19:25


HPI/ROS: 


CHIEF COMPLAINT:  Redness on the right leg





HISTORY OF PRESENT ILLNESS:  63-year-old male was seen in the emergency 

department 2 days ago following a injury to his right leg.  In at that time he 

injured his leg on exercise machine over can with his .  

Evaluation emergency department included a x-ray demonstrating no fracture.  

Patient reports that the right lower leg has continued to be uncomfortable, now 

with some bruising and erythema, some warmth, and swelling.  Denies any 

shortness of breath.  Denies any prior history of DVTs or PEs.  No fevers or 

vomiting or nausea.





No chest pain, shortness of breath, palpitations, vomiting, diarrhea, urinary 

complaints, headache, lightheadedness.  





REVIEW OF SYSTEMS: 


A comprehensive 10 system review of systems was reviewed and is otherwise 

negative aside from elements mentioned in the history of present illness and 

medical decision making.





PAST MEDICAL HISTORY:  Significant for developmental disability, diabetes.





SOCIAL HISTORY:  Patient is here with a caregiver.  Family members were also 

present later during the emergency department course.  





VITAL SIGNS:  see nurse's notes.


GENERAL:  Well-developed, well-nourished, in no acute distress.  Pleasant, able 

to answer the majority of questions.


HEENT:  Normal, no discharge or icterus, moist mucous membranes. Neck:  supple, 

FROM.


LUNGS:  Clear to auscultation bilaterally, no wheezes, rhonchi or rales.


CARDIAC:  Regular rate and rhythm, no rubs, murmurs or gallops.


ABDOMEN:  Soft, nontender, nondistended, bowel sounds normal.


BACK:  No CVA tenderness.  No vertebral tenderness.


EXTREMITIES:  Right lower extremity:  1+ pitting edema to the knee.  There is a 

healing abrasion over the distal shin.  Ecchymosis is present inferiorly and 

laterally.  Patient does have erythema and warmth approximately 4 cm above and 

below the skin abrasion.  Isolated blister present superior to the abrasion.  

Calf compartments are soft.  Negative Homans sign.  Popliteal fossa is soft and 

nontender.


Left lower extremity:  Patient has a healing superficial laceration with very 

mild erythema distally.  No edema on the left lower extremity.


NEURO:  Alert and oriented, grossly nonfocal.


SKIN:  Warm and dry, no rash.





Smoking Status: Never smoked


Constitutional: 


 Initial Vital Signs











Temperature (C)  36.6 C   11/18/18 18:46


 


Heart Rate  73   11/18/18 18:46


 


Respiratory Rate  16   11/18/18 18:46


 


Blood Pressure  168/86 H  11/18/18 18:46


 


O2 Sat (%)  93   11/18/18 18:46








 











O2 Delivery Mode               Room Air














Allergies/Adverse Reactions: 


 





No Known Allergies Allergy (Verified 11/05/18 12:44)


 








Home Medications: 














 Medication  Instructions  Recorded


 


Arimidex 1 mg (*)  08/15/16


 


Aspirin 81mg (*)  08/15/16


 


BETAGAN 0.5% (*)  08/15/16


 


Lisinopril  08/15/16


 


MAGNESIUM  08/15/16


 


Ranitidine HCl  08/15/16


 


Various Vitamins  08/15/16


 


Ferrous Sulfate  12/20/17


 


Glucosamine  12/20/17


 


Mirapex  12/20/17


 


Ranitidine HCl 150 mg PO BID #14 tablet 01/10/18


 


Cephalexin [Keflex (RX)] 500 mg PO QID 7 Days  cap 11/18/18














Medical Decision Making





- Diagnostics


Imaging Results: 


 Imaging Impressions





Extremity Venous Study  11/18/18 19:34


Impression: No evidence of deep vein thrombosis in the right lower extremity.


 


Results called and discussed with Neema Kruse MD on 11/18/2018 at 21:27.


 











Imaging: Discussed imaging studies w/ On call Radiologist


ED Course/Re-evaluation: 





63-year-old male with diabetes now presenting with erythema on the right lower 

extremity along with localized swelling and warmth in the setting of prior 

trauma with a skin abrasion.





Patient had a IV established and received a L of normal saline.


Patient's white count is normal.  Lactic acid is negative.  Electrolytes are 

largely unremarkable.


Ultrasound was ordered to evaluate for DVT.  This was negative.


Patient received 1 g of Ancef IV and was discharged on Keflex 500 mg 4 times a 

day for 7 days.





The differential diagnosis considered included cellulitis, deep space infection

, abscess, MRSA, MSSA, DVT, compartment syndrome, necrotizing fasciitis.





Laboratory evaluation is largely unremarkable and is reassuring.  Ultrasound 

was negative for DVTs.  I doubt MRSA initially as there is no purulence.  

Patient will be placed on Keflex and will follow up if he is not improving as 

expected.











- Data Points


Laboratory Results: 


 











  11/18/18 11/18/18





  20:21 20:18


 


POC Sodium    140 mEq/L mEq/L





    (135-145) 


 


POC Potassium    3.1 mEq/L L mEq/L





    (3.3-5.0) 


 


POC Chloride    97.0 mEq/L mEq/L





    () 


 


POC Total CO2    27 mEq/L mEq/L





    (22-31) 


 


POC BUN    15 mg/dL mg/dL





    (7-23) 


 


POC Creatinine    1.2 mg/dL mg/dL





    (0.7-1.3) 


 


POC Glucose    132 mg/dL H mg/dL





    () 


 


POC Lactic Acid Ajit  1.6 mmol/L mmol/L  





   (0.7-2.1)  


 


POC Calcium    9.5 mg/dL mg/dL





    (8.5-10.4) 











Medications Given: 


 








Discontinued Medications





Cefazolin Sodium (Ancef)  1 gm IVP EDNOW ONE


   PRN Reason: Protocol


   Stop: 11/18/18 19:39


   Last Admin: 11/18/18 20:36 Dose:  1 gm


Sodium Chloride (Ns)  1,000 mls @ 0 mls/hr IV ONCE ONE; Wide Open


   PRN Reason: Protocol


   Stop: 11/18/18 19:34


   Last Admin: 11/18/18 20:37 Dose:  1,000 mls


Ibuprofen (Motrin)  600 mg PO EDNOW ONE


   Stop: 11/18/18 19:39


   Last Admin: 11/18/18 20:36 Dose:  600 mg





Point of Care Test Results: 


 CBC











CBC Collection Date            11/18/18


 


CBC Collection Time            20:14


 


WBC                            9.1


 


RBC                            4.99


 


HGB                            15.4


 


HCT                            44.8


 


PLT                            263


 


Neut #                         6.1


 


Neut                           67.7


 


LYMPH #                        2.5


 


LYMPH                          27.2


 


Other WBC #                    0.5


 


Other WBC                      5.1


 


MCV                            89.8











 Chemistry











  11/18/18





  20:18


 


POC Sodium  140 mEq/L mEq/L





   (135-145) 


 


POC Potassium  3.1 mEq/L L mEq/L





   (3.3-5.0) 


 


POC Chloride  97.0 mEq/L mEq/L





   () 


 


POC Total CO2  27 mEq/L mEq/L





   (22-31) 


 


POC BUN  15 mg/dL mg/dL





   (7-23) 


 


POC Creatinine  1.2 mg/dL mg/dL





   (0.7-1.3) 


 


POC Glucose  132 mg/dL H mg/dL





   () 


 


POC Calcium  9.5 mg/dL mg/dL





   (8.5-10.4) 








 Blood Gas/Lactic Acid-Venous











  11/18/18





  20:21


 


POC Lactic Acid Ajit  1.6 mmol/L mmol/L





   (0.7-2.1) 














Departure





- Departure


Disposition: Home, Routine, Self-Care


Clinical Impression: 


 Ecchymosis





Cellulitis


Qualifiers:


 Site of cellulitis: extremity Site of cellulitis of extremity: lower extremity 

Laterality: right Qualified Code(s): L03.115 - Cellulitis of right lower limb





Condition: Good


Instructions:  Cellulitis (ED), Ecchymosis (ED)


Additional Instructions: 


Please take antibiotic as directed to help with the infection in the skin.


Keflex 500 mg by mouth 4 times a day for the next 7 days





Keep the leg elevated as much as possible for the next 24 hr.  





Light compression will be helpful to assist with the swelling but do not make 

it too tight.





If he develops a fever, worsening redness on the leg, worsening pain, shortness 

of breath, nausea, or vomiting, please be sure that he is seen by physician or 

proceed to the nearest emergency department.


Referrals: 


Mariola Choi MD [Primary Care Provider] - As per Instructions


Stand Alone Forms:  Statement of Treatment


Prescriptions: 


Cephalexin [Keflex (RX)] 500 mg PO QID 7 Days  cap

## 2019-02-05 ENCOUNTER — HOSPITAL ENCOUNTER (EMERGENCY)
Dept: HOSPITAL 80 - CED | Age: 64
Discharge: HOME | End: 2019-02-05
Payer: COMMERCIAL

## 2019-02-05 VITALS — SYSTOLIC BLOOD PRESSURE: 129 MMHG | DIASTOLIC BLOOD PRESSURE: 78 MMHG

## 2019-02-05 DIAGNOSIS — L03.115: Primary | ICD-10-CM

## 2019-02-05 DIAGNOSIS — E66.01: ICD-10-CM

## 2019-02-05 DIAGNOSIS — F79: ICD-10-CM

## 2019-02-05 DIAGNOSIS — I10: ICD-10-CM

## 2019-02-05 DIAGNOSIS — E11.9: ICD-10-CM

## 2019-02-05 NOTE — EDPHY
H & P


Stated Complaint: Rt leg redness


Time Seen by Provider: 02/05/19 15:10


HPI/ROS: 





CHIEF COMPLAINT:  Erythema at the lower shin, concerns cellulitis has recurred 





HISTORY OF PRESENT ILLNESS:  


This is a 63-year-old male who is accompanied by his sister who has 

guardianship.  Apparently he has longstanding developmental delay and thereby 

requires guardianship.  Furthermore he has notable confounding factors to 

include morbid obesity as well as diabetes. 





The patient reports the area on the shin is been erythematous for 2 days 

without any change her expansion.  He is having no calf or thigh pain.  He is 

having no shortness rather difficulty breathing.  He has had no fevers or 

chills lymphangitis.





He only stole the sister about this today, she investigated the area and 

decided to have it looked at here.





This past November he had a fall and sustained a large hematoma to the shin and 

there was extensive erythema with possibility of seeding of the hematoma.  Thus 

he was admitted to [facility], had exploration negative cultures of the 

hematoma site itself.  During that time is on Ancef 2 g IV twice daily





P:  Not worse with walking


Q:  Achiness


R:  No radiation


S:  Mild


T:  The started 2 days ago, not getting worse





REVIEW OF SYSTEMS:


Constitutional:  No fever, no chills.


Eyes:  No discharge 


ENT:  No sore throat.


Cardiovascular:  No chest pain, no palpitations.


Respiratory:  No cough, shortness of breath, or wheezing.


Gastrointestinal:  No Nausea, vomiting, abdominla pain or diarrhea


Genitourinary:  No hematuria or frequency.


Musculoskeletal:  No back pain.


Skin:  No rashes.


Neurological:  No headache.





A 10 system review of systems was performed and is negative except for the 

noted findings in the HPI.


Source: Patient





- Personal History


Current Tetanus/Diphtheria Vaccine: Yes


Current Tetanus Diphtheria and Acellular Pertussis (TDAP): Yes


Tetanus Vaccine Date: 2012





- Medical/Surgical History


Hx Asthma: No


Hx Chronic Respiratory Disease: No


Hx Diabetes: Yes


Hx Cardiac Disease: No


Hx Renal Disease: No


Hx Cirrhosis: No


Hx Alcoholism: No


Hx HIV/AIDS: No


Hx Splenectomy or Spleen Trauma: No


Other PMH: htn/tbi/diabetes 2, GERD, BELLA, glaucoma, restless leg, mental 

retardation





- Social History


Smoking Status: Never smoked


Alcohol Use: None


Drug Use: None





- Physical Exam


Exam: 





General Appearance:  Alert, no distress.  Afebrile.  Normal phonation.  No 

respiratory distress.  Ready smile. 


Eyes:  Pupils equal and round no pallor or injection.   No icterus


ENT, Mouth:  Mucous membranes moist   Pharynx without erythema or exudate.  TM 

Clear.  


Neck:  No adenopathy.  Supple.  No JVD.  Trachea in midline.


Respiratory:  There are no retractions, lungs are clear to auscultation.


Cardiovascular:  Regular rate and rhythm, no murmur.


Abdomen:  Soft and nontender, no masses, bowel sounds normal.  Obese.


Neurological:  Ox3.  No motor weakness.  Sensation intact.  Gait nl.


Skin:  Warm and dry, no rashes.  


Musculoskeletal:  No joint swelling.


Extremities:  No edema. Homans sign negative.  No cords.  On the midportion of 

the right shin there is a soft tissue swelling compatible with old prior 

changes from his injury.  There is also surgical scar evident.  Just below this 

soft tissue swelling there is a patch of erythema which is slightly warm, 8 cm, 

with various scab type formations that appear benign over the center of this.  

His sister speculates that he has been itching the area.  She also notes that 

the site where the drain had been just somewhat lateral to this as formed a 

scab as well.  No active drainage from anywhere at this point in time


Psychiatric:  Normal affect.  Patient is oriented X 3.  There is no agitation


Constitutional: 


 Initial Vital Signs











Temperature (C)  37 C   02/05/19 14:59


 


Heart Rate  73   02/05/19 14:59


 


Respiratory Rate  12   02/05/19 14:59


 


Blood Pressure  133/65 H  02/05/19 14:59


 


O2 Sat (%)  91 L  02/05/19 14:59








 











O2 Delivery Mode               Room Air














Allergies/Adverse Reactions: 


 





No Known Allergies Allergy (Verified 02/05/19 15:29)


 








Home Medications: 














 Medication  Instructions  Recorded


 


Aspirin [Aspirin 81mg (*)] 81 mg PO DAILY 08/15/16


 


Levobunolol 0.5% [BETAGAN 0.5% (*)] 1 drops LEFTEYE BID 08/15/16


 


Lisinopril [Zestril 20 mg (*)] 20 mg PO DAILY 08/15/16


 


Ferrous Sulfate [Ferrous Sulf 325 325 mg PO DAILY 12/20/17





MG (*)]  


 


Glucosamine Sulfate [Glucosamine 1,500 mg PO DAILY 12/20/17





Sulfate 500 MG (*)]  


 


Cholecalciferol Vit D3 [Vitamin D3 2,000 units PO DAILY 11/21/18





2000 units tab (OTC)]  


 


Cyanocobalamin [Vitamin B12 (*)] 1,000 mcg PO DAILY 11/21/18


 


Dapagliflozin Propanediol [Farxiga] 5 mg PO Q2D 11/21/18


 


Famotidine [Pepcid 20 MG (*)] 20 mg PO HS 11/21/18


 


Finasteride [Proscar 5 MG (*)] 5 mg PO HS 11/21/18


 


Herbals/Supplements -Info Only 1 each PO DAILY 11/21/18


 


Loratadine [Claritin 10 mg] 10 mg PO DAILY 11/21/18


 


Align Probiotic 1 tab PO DAILY 11/24/18


 


Cephalexin [Keflex (*)] 500 mg PO TID #21 cap 02/05/19


 


Vesicare  02/05/19














Medical Decision Making


ED Course/Re-evaluation: 





The patient remained nontoxic while here in the ER.  His white count was normal 

as well as lactic acid.  





His creatinine has been drifting up in the last year, as noted on the discharge 

plan is gone up from 1.0 to 1.4 this past November to 1.6 now.  He is to have 

follow-up with his family physician regarding this.  I have discussed this with 

the patient as well as his sister, the guarding.





Also, I discussed the case with Dr. Howard,, general surgeon, who did his 

expiration of hematoma this past November and was seen the patient in follow-up 

since that time.  Dr. Howard will see the patient on Thursday however the interim 

if he requires admission he has a [facility] on the hospitalist 

service hanging arrange for consultation.





Laboratory studies:


Normal white count


Number electrolytes


Elevated creatinine 1.6


Nothing to culture.





Old charts reviewed from the November 2018 admission as well as the 

consultation with Infectious Disease, there progress notes, and the discharge 

summary as well as the hospitalist notes.





He was given Keflex 500 mg p.o. Script given.  Next dose is to be 2200:


Differential Diagnosis: 





The differential diagnosis includes but is not limited to:





Cellulitis, septic syndrome, necrotizing fasciitis, erythema related to 

scratching.





- Data Points


Laboratory Results: 


 











  02/05/19 02/05/19





  17:25 17:22


 


POC Sodium    140 mEq/L mEq/L





    (135-145) 


 


POC Potassium    4.6 mEq/L mEq/L





    (3.3-5.0) 


 


POC Chloride    99.0 mEq/L mEq/L





    () 


 


POC Total CO2    28 mEq/L mEq/L





    (22-31) 


 


POC BUN    12 mg/dL mg/dL





    (7-23) 


 


POC Creatinine    1.4 mg/dL H mg/dL





    (0.7-1.3) 


 


POC Glucose    106 mg/dL H mg/dL





    () 


 


POC Lactic Acid Ajit  1.2 mmol/L mmol/L  





   (0.7-2.1)  


 


POC Calcium    9.6 mg/dL mg/dL





    (8.5-10.4) 











Medications Given: 


 








Discontinued Medications





Cephalexin HCl (Keflex)  500 mg PO EDNOW ONE


   PRN Reason: Protocol


   Stop: 02/05/19 17:51


   Last Admin: 02/05/19 18:06 Dose:  500 mg





Point of Care Test Results: 


 CBC











CBC Collection Date            02/05/19


 


CBC Collection Time            17:15


 


WBC                            7.83


 


RBC                            5.44


 


HGB                            16.3


 


HCT                            49.2


 


PLT                            254


 


Neut #                         4.77


 


Neut                           60.9


 


LYMPH #                        2.15


 


LYMPH                          27.5


 


MCV                            90.4











 Chemistry











  02/05/19





  17:22


 


POC Sodium  140 mEq/L mEq/L





   (135-145) 


 


POC Potassium  4.6 mEq/L mEq/L





   (3.3-5.0) 


 


POC Chloride  99.0 mEq/L mEq/L





   () 


 


POC Total CO2  28 mEq/L mEq/L





   (22-31) 


 


POC BUN  12 mg/dL mg/dL





   (7-23) 


 


POC Creatinine  1.4 mg/dL H mg/dL





   (0.7-1.3) 


 


POC Glucose  106 mg/dL H mg/dL





   () 


 


POC Calcium  9.6 mg/dL mg/dL





   (8.5-10.4) 








 Blood Gas/Lactic Acid-Venous











  02/05/19





  17:25


 


POC Lactic Acid Ajit  1.2 mmol/L mmol/L





   (0.7-2.1) 














Departure





- Departure


Disposition: Home, Routine, Self-Care


Clinical Impression: 


Cellulitis


Qualifiers:


 Site of cellulitis: extremity Site of cellulitis of extremity: lower extremity 

Laterality: right Qualified Code(s): L03.115 - Cellulitis of right lower limb





Condition: Good


Instructions:  Cellulitis (ED)


Additional Instructions: 


Home to rest.  





Elevate her leg as much as possible.  However you're not confined to bed





Recheck in 2 days with Dr. Howard.  While there anticipating you, please call 

tomorrow to set up appointment for Thursday.





If in the interim the redness extends beyond our line by more than 1 in, return 

to the ER.





Further, if he started feeling on well with worse pain or red streaks or 

feeling feverish, then return to the ER.





Your creatinine, a measure of kidney function, is a little up from this past 

November.  It is up quite a bit from this past January of 2018.  Thereby it is 

important your medical doctor seizure for this in the next week or 2. 


Referrals: 


Mariola Choi MD [Primary Care Provider] - As per Instructions


Sam Howard MD [Medical Doctor] - 2-3 days, call for appt.


Prescriptions: 


Cephalexin [Keflex (*)] 500 mg PO TID #21 cap

## 2019-02-13 ENCOUNTER — HOSPITAL ENCOUNTER (EMERGENCY)
Dept: HOSPITAL 80 - FED | Age: 64
Discharge: HOME | End: 2019-02-13
Payer: COMMERCIAL

## 2019-02-13 VITALS — DIASTOLIC BLOOD PRESSURE: 82 MMHG | SYSTOLIC BLOOD PRESSURE: 158 MMHG

## 2019-02-13 DIAGNOSIS — Z79.4: ICD-10-CM

## 2019-02-13 DIAGNOSIS — L03.115: Primary | ICD-10-CM

## 2019-02-13 DIAGNOSIS — Z79.2: ICD-10-CM

## 2019-02-13 DIAGNOSIS — E11.628: ICD-10-CM

## 2019-02-13 DIAGNOSIS — I10: ICD-10-CM

## 2019-02-13 NOTE — EDPHY
H & P


Time Seen by Provider: 02/13/19 17:24


HPI/ROS: 





HPI


Cellulitis to right shin.





63-year-old male who is a type 2 diabetic by private vehicle with family.  This 

patient scraped his mid right shin and sustained an abrasion about a week and a 

half ago.  He developed an associated cellulitis, isolated to this area.  He 

just finished a 7 day course of Keflex and the cellulitis is persisting.  He 

states that it may be improved a bit but then seemed to worsen slightly.  He 

has not had a fever.  He states that the area is slightly painful.  No purulent 

drainage.  No loss of sensation or weakness distally.





ROS:





Constitutional:  No fever, no chills.  No weakness.


Eyes:  No discharge.  No changes in vision.


ENT:  No sore throat.  No nasal congestion or rhinorrhea.


Respiratory:  No cough.  No shortness of breath.


Cardiac:  No chest pain, no palpitations.


Gastrointestinal:  No abdominal pain, no vomiting, no diarrhea.


Genitourinary:  No hematuria.  No dysuria or increased frequency with urination.


Musculoskeletal:  No back pain.  No neck pain.  No myalgias or arthralgias.


Skin:  As above.


Neurological:  No headache.  No focal weakness or altered sensation.





Past medical history:  Hypertension, traumatic brain injury, type 2 diabetes, 

GERD, osteoarthritis, glaucoma, restless leg syndrome.





Social history:  Here with family.  No alcohol.  Nonsmoker.





Physical Exam:





General Appearance:  Alert, no distress.  This patient is responding to 

questions appropriately and in full sentences.  This patient appears well-

hydrated and well-nourished.


Eyes:  Pupils equal and round no pallor or injection.  No lid edema, erythema 

or injection.


Right lower extremity exam:  Significant for an area on the mid shin, about 6 

cm in length and 4-5 cm in with of erythema around several scabbed over 

abrasions.  No purulence.  No crepitus on palpation of this area.  It is warm.  

Not significantly painful.  No edema beyond erythema.  The right lower 

extremity is neurovascularly intact.


Neurological:  Motor sensory function is grossly intact.  Cranial nerves are 

normal.  Gait is normal.


Skin:  Warm and dry, as above, otherwise no rashes.


Musculoskeletal:  Neck is supple and nontender.


Extremities are symmetrical.  All joints range without pain or impingement.


Psychiatric:  No agitation.  No depression.





Database:





EKG:





Imaging:





Procedures:





Emergency department course:





Triage vital signs reviewed and are unremarkable.  Patient presents with a 

cellulitis involving the right shin, persisting after failed treatment, 7 day 

course of Keflex.





5:40 p.m., spoke with on-call infectious disease specialist Dr. Torsten Nair.

  Case discussed in detail with him.  He will see this patient on follow-up in 

his office tomorrow.  He recommends that we start the patient on Augmentin and 

doxycycline.  The patient was given 100 mg of doxycycline and 875 mg of 

Augmentin in the emergency department.  The plan for antibiotic change, dosing 

as well as close follow-up with Infectious Disease was discussed with the 

patient and his family.  I discussed my conversation with Infectious Disease in 

detail with the patient and family.  They feel comfortable with this plan.  The 

patient feels comfortable being discharged home.  Return to emergency 

department precautions were thoroughly reviewed with all of them.  All of their 

questions were answered.  The patient was discharged home in good condition 

with family.





Differential Diagnosis:





The differential diagnosis on this patient includes but is not limited to 

recurrent cellulitis of right shin.  DVT, osteomyelitis, necrotizing fasciitis 

unlikely.  This represents a partial list of diagnoses considered.  These 

considerations are based on history, physical exam, past history, reassessment 

and diagnostic testing.


Smoking Status: Never smoked


Constitutional: 





 Initial Vital Signs











Temperature (C)  37.0 C   02/13/19 17:02


 


Heart Rate  77   02/13/19 17:02


 


Respiratory Rate  18   02/13/19 17:02


 


Blood Pressure  135/71 H  02/13/19 17:02


 


O2 Sat (%)  92   02/13/19 17:02








 











O2 Delivery Mode               Room Air














Allergies/Adverse Reactions: 


 





No Known Allergies Allergy (Verified 02/13/19 17:02)


 








Home Medications: 














 Medication  Instructions  Recorded


 


Aspirin [Aspirin 81mg (*)] 81 mg PO DAILY 08/15/16


 


Levobunolol 0.5% [BETAGAN 0.5% (*)] 1 drops LEFTEYE BID 08/15/16


 


Lisinopril [Zestril 20 mg (*)] 20 mg PO DAILY 08/15/16


 


Ferrous Sulfate [Ferrous Sulf 325 325 mg PO DAILY 12/20/17





MG (*)]  


 


Glucosamine Sulfate [Glucosamine 1,500 mg PO DAILY 12/20/17





Sulfate 500 MG (*)]  


 


Cholecalciferol Vit D3 [Vitamin D3 2,000 units PO DAILY 11/21/18





2000 units tab (OTC)]  


 


Cyanocobalamin [Vitamin B12 (*)] 1,000 mcg PO DAILY 11/21/18


 


Dapagliflozin Propanediol [Farxiga] 5 mg PO Q2D 11/21/18


 


Famotidine [Pepcid 20 MG (*)] 20 mg PO HS 11/21/18


 


Finasteride [Proscar 5 MG (*)] 5 mg PO HS 11/21/18


 


Herbals/Supplements -Info Only 1 each PO DAILY 11/21/18


 


Loratadine [Claritin 10 mg] 10 mg PO DAILY 11/21/18


 


Align Probiotic 1 tab PO DAILY 11/24/18


 


Cephalexin [Keflex (*)] 500 mg PO TID #21 cap 02/05/19


 


Vesicare  02/05/19


 


Amoxicillin/Clavulanate Pot 875 mg PO BID 7 Days  tab 02/13/19





[Augmentin 875 mg tab]  


 


Doxycycline Hyclate 100 mg PO BID #14 tab 02/13/19














Departure





- Departure


Disposition: Home, Routine, Self-Care


Clinical Impression: 


 Cellulitis of right leg





Condition: Good


Instructions:  Cellulitis (ED)


Additional Instructions: 


Read and follow provided instructions.





Follow-up with Infectious Disease, Dr. Torsten Nair, within the next 1-2 days 

for re-evaluation.  He should call you for appointment time tomorrow morning.  

If you do not hear from him by noon time.  Call his office to schedule an 

appointment.





Take antibiotic as prescribed through entire course of treatment.





Return to the emergency department for worsening swelling, pain, spreading of 

redness beyond the lines that I drew, fever or other serious concerns.


Referrals: 


Torsten Nair MD [Medical Doctor] - As per Instructions


Prescriptions: 


Amoxicillin/Clavulanate Pot [Augmentin 875 mg tab] 875 mg PO BID 7 Days  tab


Doxycycline Hyclate 100 mg PO BID #14 tab

## 2019-06-18 ENCOUNTER — HOSPITAL ENCOUNTER (OUTPATIENT)
Dept: HOSPITAL 80 - BMCIMAGING | Age: 64
End: 2019-06-18
Payer: COMMERCIAL